# Patient Record
Sex: FEMALE | Race: WHITE | NOT HISPANIC OR LATINO | URBAN - METROPOLITAN AREA
[De-identification: names, ages, dates, MRNs, and addresses within clinical notes are randomized per-mention and may not be internally consistent; named-entity substitution may affect disease eponyms.]

---

## 2017-01-03 ENCOUNTER — ON CAMPUS - OUTPATIENT (OUTPATIENT)
Dept: URBAN - METROPOLITAN AREA HOSPITAL 14 | Facility: HOSPITAL | Age: 67
End: 2017-01-03
Payer: COMMERCIAL

## 2017-01-03 DIAGNOSIS — K29.80 DUODENITIS WITHOUT BLEEDING: ICD-10-CM

## 2017-01-03 DIAGNOSIS — K29.60 OTHER GASTRITIS WITHOUT BLEEDING: ICD-10-CM

## 2017-01-03 DIAGNOSIS — R19.7 DIARRHEA, UNSPECIFIED: ICD-10-CM

## 2017-01-03 DIAGNOSIS — K21.9 GASTRO-ESOPHAGEAL REFLUX DISEASE WITHOUT ESOPHAGITIS: ICD-10-CM

## 2017-01-03 DIAGNOSIS — R10.13 EPIGASTRIC PAIN: ICD-10-CM

## 2017-01-03 PROCEDURE — 43239 EGD BIOPSY SINGLE/MULTIPLE: CPT

## 2017-06-21 ENCOUNTER — OFFICE (OUTPATIENT)
Dept: URBAN - METROPOLITAN AREA CLINIC 33 | Facility: CLINIC | Age: 67
End: 2017-06-21

## 2017-06-21 VITALS
DIASTOLIC BLOOD PRESSURE: 60 MMHG | WEIGHT: 150 LBS | SYSTOLIC BLOOD PRESSURE: 127 MMHG | HEART RATE: 71 BPM | TEMPERATURE: 97.5 F | HEIGHT: 61 IN

## 2017-06-21 DIAGNOSIS — R19.7 DIARRHEA, UNSPECIFIED: ICD-10-CM

## 2017-06-21 DIAGNOSIS — Z86.010 PERSONAL HISTORY OF COLONIC POLYPS: ICD-10-CM

## 2017-06-21 DIAGNOSIS — R63.5 ABNORMAL WEIGHT GAIN: ICD-10-CM

## 2017-06-21 DIAGNOSIS — K21.0 GASTRO-ESOPHAGEAL REFLUX DISEASE WITH ESOPHAGITIS: ICD-10-CM

## 2017-06-21 PROCEDURE — 99214 OFFICE O/P EST MOD 30 MIN: CPT

## 2017-06-21 NOTE — SERVICEHPINOTES
ODILIA FISHMAN   is a   66   year old    female who is being seen in consultation at the request of   MEGHNA SERRANO   for follow to procedures. She had an EGD 1/3/2017-hiatal hernia, gastritis, negative H. pylori, and negative celiac sprue. She has been taking pantoprazole 40mg daily with excellent control of GERD. Denies n/v, dysphagia and melena. She takes colestid prn. Diarrhea has improved.  She has a BM twice a day. Stools are BSS type 4-5. No blood present. Denies abdominal pain.She is upset that she continues to gain weight. She doesn't understand why. She has gained almost #10 since her last office visit in November. She drinks about 3 diet sodas daily. She used to go through #5 of sugar a month so she switched to Sweet N' Low. She thought this was a better alternative. Some days she eats 3 bowls of cereal.

## 2018-09-10 ENCOUNTER — OFFICE (OUTPATIENT)
Dept: URBAN - METROPOLITAN AREA CLINIC 101 | Facility: CLINIC | Age: 68
End: 2018-09-10

## 2018-09-10 VITALS
HEART RATE: 75 BPM | SYSTOLIC BLOOD PRESSURE: 141 MMHG | WEIGHT: 149 LBS | TEMPERATURE: 97.9 F | HEIGHT: 61 IN | DIASTOLIC BLOOD PRESSURE: 80 MMHG

## 2018-09-10 DIAGNOSIS — K90.89 OTHER INTESTINAL MALABSORPTION: ICD-10-CM

## 2018-09-10 DIAGNOSIS — R12 HEARTBURN: ICD-10-CM

## 2018-09-10 DIAGNOSIS — R10.13 EPIGASTRIC PAIN: ICD-10-CM

## 2018-09-10 PROCEDURE — 99214 OFFICE O/P EST MOD 30 MIN: CPT

## 2018-09-10 RX ORDER — PANTOPRAZOLE SODIUM 40 MG/1
TABLET, DELAYED RELEASE ORAL
Qty: 90 | Refills: 3 | Status: ACTIVE

## 2018-09-10 RX ORDER — RANITIDINE 150 MG/1
TABLET ORAL
Qty: 90 | Refills: 3 | Status: COMPLETED
Start: 2018-09-10 | End: 2019-12-30

## 2018-09-10 NOTE — SERVICEHPINOTES
Mrs. Cervantes is a 68-year-old female patient who presents for annual followup.  She was out of town and was not able to refill of panroprazole for over a week. Now, she has pain at epigastric region which she describes as "hot and burning sensation". Nausea is associated without vomiting. Denies dysphagia. The pain is constant. Eating aggravates the pain and it gets worse at night. Trying to lose weight. BRReports having bowel movements 2- 3 times daily, on BSS type 4-7. Denies blood in stool, melena, lower abdominal pain or constipation. The diarrhea started since ileocecectomy, 2015 with history of tubulovillous adenoma polyp over ileolcecal valve. The colonoscopy from 12/2016 was done with indication of personal history of polyp and diarrhea and it was unremarkable. She is to repeat a colonoscopy next year. BRS/P cholecystectomy after the bowel surgery.  Last EGD was done in 01/2017 and gastric and duodenum biopsies were unremarkable.  Pantoprazole helped with the upper GI symptoms.  BRTook Advil for stomach pain, once daily for 2 days. She sees Dr. Malone for thrombocythemia.  REBECCA

## 2019-12-30 ENCOUNTER — OFFICE (OUTPATIENT)
Dept: URBAN - METROPOLITAN AREA CLINIC 101 | Facility: CLINIC | Age: 69
End: 2019-12-30

## 2019-12-30 VITALS
TEMPERATURE: 97.1 F | DIASTOLIC BLOOD PRESSURE: 69 MMHG | SYSTOLIC BLOOD PRESSURE: 127 MMHG | WEIGHT: 146 LBS | HEART RATE: 71 BPM | HEIGHT: 61 IN

## 2019-12-30 DIAGNOSIS — K21.0 GASTRO-ESOPHAGEAL REFLUX DISEASE WITH ESOPHAGITIS: ICD-10-CM

## 2019-12-30 DIAGNOSIS — Z86.010 PERSONAL HISTORY OF COLONIC POLYPS: ICD-10-CM

## 2019-12-30 PROCEDURE — 99214 OFFICE O/P EST MOD 30 MIN: CPT

## 2019-12-30 NOTE — SERVICEHPINOTES
ODILIA FISHMAN   is a   69  female who presents prior to colonoscopy. Hx of  lap assisted ileocecectomy in 2014 with hx of tubulovillous adenoma involving the ileocecal valve. BRLast colonoscopy in 12/2016 unremarkable other than evidence of prior surgery. Advised to repeat it in 3 years. BRMoves bowel up 2-3 times daily on BSS type 4 or 7. Occasional diarrhea which is not new. Denies blood in stool, melena, constipation, abdominal pain or unintentional weight loss. BRDenies nausea, vomiting, dysphagia, dyspepsia or early satiety. BR+ Acid reflux, takes pantoprazole 40 mg in AM. Used to take ranitidine 150 mg at night which used to work. BRSymptoms can be worse at night.   Currently on aspirin 81 mg. BRSees Dr. Magaña for hx of heart attack in 1998.Sees Dr. Gutierrez hx of blood cancer, takes hydroxyurea.  Denies family hx of colon cancer. BR

## 2020-02-21 ENCOUNTER — ON CAMPUS - OUTPATIENT (OUTPATIENT)
Dept: URBAN - METROPOLITAN AREA HOSPITAL 14 | Facility: HOSPITAL | Age: 70
End: 2020-02-21
Payer: COMMERCIAL

## 2020-02-21 DIAGNOSIS — Z86.010 PERSONAL HISTORY OF COLONIC POLYPS: ICD-10-CM

## 2020-02-21 DIAGNOSIS — D12.2 BENIGN NEOPLASM OF ASCENDING COLON: ICD-10-CM

## 2020-02-21 PROCEDURE — 45380 COLONOSCOPY AND BIOPSY: CPT | Mod: PT

## 2020-02-21 PROCEDURE — 45381 COLONOSCOPY SUBMUCOUS NJX: CPT

## 2021-11-03 ENCOUNTER — OFFICE (OUTPATIENT)
Dept: URBAN - METROPOLITAN AREA CLINIC 102 | Facility: CLINIC | Age: 71
End: 2021-11-03

## 2021-11-03 VITALS
SYSTOLIC BLOOD PRESSURE: 123 MMHG | HEIGHT: 60 IN | WEIGHT: 134 LBS | HEART RATE: 70 BPM | DIASTOLIC BLOOD PRESSURE: 68 MMHG | TEMPERATURE: 97.9 F

## 2021-11-03 DIAGNOSIS — Z86.010 PERSONAL HISTORY OF COLONIC POLYPS: ICD-10-CM

## 2021-11-03 DIAGNOSIS — K90.89 OTHER INTESTINAL MALABSORPTION: ICD-10-CM

## 2021-11-03 PROCEDURE — 99214 OFFICE O/P EST MOD 30 MIN: CPT

## 2021-11-03 RX ORDER — COLESTIPOL HYDROCHLORIDE 1 G/1
TABLET, FILM COATED ORAL
Qty: 60 | Refills: 2 | Status: ACTIVE
Start: 2021-11-03

## 2021-11-03 NOTE — SERVICEHPINOTES
ODILIA FISHMAN   is a   71  female who presents for OV prior to colonoscopy. H/o lap ileocecectomy in 204 for TVA involving ICV. Colonoscopy in 2016 was unremarkable.  She had a colonoscopy last year 2/2020 which showed a large TVA at anastomosis with subsequent removal with Dr Lainez at [facility] recall recommended in 6-12 months She prefers to get colonoscopy done here locally and follow up at [facility] afterwards if needed.
brShe has had diarrhea since ileocecectomy  BMs can be 4-5x/day. No sig abd pain, no rectal bleeding. She does not take imodium for fear of becoming constipated. Father had colon cancer age 49 although she is not sure if he was her father. 
brShe had MI in 1998, follows with Dr Magaña annually. On ASA 81mg. Stable and asymptomatic.br

## 2021-11-16 ENCOUNTER — OFFICE (OUTPATIENT)
Dept: URBAN - METROPOLITAN AREA CLINIC 102 | Facility: CLINIC | Age: 71
End: 2021-11-16

## 2021-11-16 PROCEDURE — 00038: CPT | Performed by: INTERNAL MEDICINE

## 2022-01-13 ENCOUNTER — ON CAMPUS - OUTPATIENT (OUTPATIENT)
Dept: URBAN - METROPOLITAN AREA HOSPITAL 16 | Facility: HOSPITAL | Age: 72
End: 2022-01-13
Payer: COMMERCIAL

## 2022-01-13 DIAGNOSIS — D12.2 BENIGN NEOPLASM OF ASCENDING COLON: ICD-10-CM

## 2022-01-13 DIAGNOSIS — Z86.010 PERSONAL HISTORY OF COLONIC POLYPS: ICD-10-CM

## 2022-01-13 PROCEDURE — 45380 COLONOSCOPY AND BIOPSY: CPT | Performed by: INTERNAL MEDICINE

## 2025-03-19 ENCOUNTER — OFFICE (OUTPATIENT)
Dept: URBAN - METROPOLITAN AREA CLINIC 102 | Facility: CLINIC | Age: 75
End: 2025-03-19
Payer: COMMERCIAL

## 2025-03-19 VITALS
TEMPERATURE: 97.5 F | DIASTOLIC BLOOD PRESSURE: 64 MMHG | HEART RATE: 70 BPM | HEIGHT: 61 IN | SYSTOLIC BLOOD PRESSURE: 129 MMHG | WEIGHT: 121 LBS

## 2025-03-19 DIAGNOSIS — R19.8 OTHER SPECIFIED SYMPTOMS AND SIGNS INVOLVING THE DIGESTIVE S: ICD-10-CM

## 2025-03-19 DIAGNOSIS — R19.4 CHANGE IN BOWEL HABIT: ICD-10-CM

## 2025-03-19 DIAGNOSIS — Z86.0101 PERSONAL HISTORY OF ADENOMATOUS AND SERRATED COLON POLYPS: ICD-10-CM

## 2025-03-19 PROBLEM — Z86.010: Status: ACTIVE | Noted: 2025-03-19

## 2025-03-19 PROCEDURE — 99204 OFFICE O/P NEW MOD 45 MIN: CPT | Performed by: NURSE PRACTITIONER

## 2025-04-03 LAB — PANCREATIC ELASTASE, FECAL: 365 UG ELAST./G (ref 200–?)
